# Patient Record
Sex: FEMALE | Race: WHITE | Employment: FULL TIME | URBAN - METROPOLITAN AREA
[De-identification: names, ages, dates, MRNs, and addresses within clinical notes are randomized per-mention and may not be internally consistent; named-entity substitution may affect disease eponyms.]

---

## 2021-01-05 LAB
SARS-COV-2: NOT DETECTED
SOURCE: NORMAL

## 2021-01-13 LAB
SARS-COV-2: NOT DETECTED
SOURCE: NORMAL

## 2021-01-20 LAB
SARS-COV-2: NOT DETECTED
SOURCE: NORMAL

## 2021-01-26 LAB
SARS-COV-2: NOT DETECTED
SOURCE: NORMAL

## 2021-02-03 LAB
SARS-COV-2: NOT DETECTED
SOURCE: NORMAL

## 2021-02-11 LAB
SARS-COV-2: NOT DETECTED
SOURCE: NORMAL

## 2021-02-18 LAB
SARS-COV-2: NOT DETECTED
SOURCE: NORMAL

## 2021-02-23 LAB
SARS-COV-2: NOT DETECTED
SOURCE: NORMAL

## 2021-03-03 LAB
SARS-COV-2: NOT DETECTED
SOURCE: NORMAL

## 2021-03-09 LAB
SARS-COV-2: NOT DETECTED
SOURCE: NORMAL

## 2021-03-16 LAB
SARS-COV-2: NOT DETECTED
SOURCE: NORMAL

## 2022-10-10 ENCOUNTER — OFFICE VISIT (OUTPATIENT)
Dept: PEDIATRICS CLINIC | Age: 57
End: 2022-10-10
Payer: COMMERCIAL

## 2022-10-10 VITALS
WEIGHT: 197 LBS | BODY MASS INDEX: 30.92 KG/M2 | HEART RATE: 90 BPM | OXYGEN SATURATION: 98 % | TEMPERATURE: 96.5 F | HEIGHT: 67 IN

## 2022-10-10 DIAGNOSIS — B34.9 VIRAL ILLNESS: ICD-10-CM

## 2022-10-10 DIAGNOSIS — B34.9 VIRAL ILLNESS: Primary | ICD-10-CM

## 2022-10-10 LAB
INFLUENZA A ANTIBODY: NORMAL
INFLUENZA B ANTIBODY: NORMAL
Lab: NORMAL
PERFORMING INSTRUMENT: NORMAL
QC PASS/FAIL: NORMAL
S PYO AG THROAT QL: NORMAL
SARS-COV-2, POC: NORMAL

## 2022-10-10 PROCEDURE — 87804 INFLUENZA ASSAY W/OPTIC: CPT | Performed by: NURSE PRACTITIONER

## 2022-10-10 PROCEDURE — 87426 SARSCOV CORONAVIRUS AG IA: CPT | Performed by: NURSE PRACTITIONER

## 2022-10-10 PROCEDURE — 87880 STREP A ASSAY W/OPTIC: CPT | Performed by: NURSE PRACTITIONER

## 2022-10-10 PROCEDURE — 99213 OFFICE O/P EST LOW 20 MIN: CPT | Performed by: NURSE PRACTITIONER

## 2022-10-10 NOTE — PROGRESS NOTES
Subjective:      Patient ID: Husam Grandchild is a 56 y.o. female who present today with:      Chief Complaint   Patient presents with    Pharyngitis    Cough    Headache     Pharyngitis  This is a new problem. The current episode started in the past 7 days. The problem occurs constantly. The problem has been unchanged. Associated symptoms include congestion, coughing, fatigue, headaches and a sore throat. Pertinent negatives include no abdominal pain, anorexia, arthralgias, change in bowel habit, chest pain, chills, diaphoresis, fever, joint swelling, myalgias, nausea, neck pain, numbness, rash, swollen glands, urinary symptoms, vertigo, visual change, vomiting or weakness. Nothing aggravates the symptoms. She has tried nothing for the symptoms. The treatment provided no relief. Cough  This is a new problem. The current episode started in the past 7 days. The problem has been unchanged. The problem occurs constantly. The cough is Non-productive. Associated symptoms include headaches, nasal congestion and a sore throat. Pertinent negatives include no chest pain, chills, ear congestion, ear pain, eye redness, fever, heartburn, hemoptysis, myalgias, postnasal drip, rash, rhinorrhea, shortness of breath, sweats, weight loss or wheezing. Nothing aggravates the symptoms. She has tried nothing for the symptoms. The treatment provided no relief. There is no history of asthma, bronchiectasis, bronchitis, COPD, emphysema, environmental allergies or pneumonia.      Past Medical History:   Diagnosis Date    Hematuria, microscopic         Migraine      Patient Active Problem List    Diagnosis Date Noted    Bilateral hearing loss 12/11/2019    Cervicalgia 12/11/2019    Obesity, Class I, BMI 30-34.9 04/02/2019    Myopia with presbyopia 12/04/2014    Adjustment disorder with mixed anxiety and depressed mood 11/13/2014    Migraine     Hematuria, microscopic      Past Surgical History:   Procedure Laterality Date    DILATION AND CURETTAGE OF UTERUS  2005    OVARIAN CYST SURGERY  1988    ruptured     Social History     Socioeconomic History    Marital status:      Spouse name: None    Number of children: None    Years of education: None    Highest education level: None   Tobacco Use    Smoking status: Never    Smokeless tobacco: Never   Substance and Sexual Activity    Alcohol use: Yes     Comment: few/ wk    Drug use: No     Current Outpatient Medications on File Prior to Visit   Medication Sig Dispense Refill    SUMAtriptan-Naproxen Sodium (TREXIMET)  MG TABS tablet Take 1 tablet by mouth once for 1 dose. 9 tablet 6     No current facility-administered medications on file prior to visit. No Known Allergies     Review of Systems   Constitutional:  Positive for activity change and fatigue. Negative for appetite change, chills, diaphoresis, fever and weight loss. HENT:  Positive for congestion and sore throat. Negative for ear pain, mouth sores, postnasal drip, rhinorrhea, sinus pressure, sinus pain and trouble swallowing. Eyes:  Negative for photophobia, pain, discharge, redness and itching. Respiratory:  Positive for cough. Negative for hemoptysis, chest tightness, shortness of breath and wheezing. Cardiovascular:  Negative for chest pain. Gastrointestinal:  Negative for abdominal pain, anorexia, change in bowel habit, constipation, diarrhea, heartburn, nausea and vomiting. Musculoskeletal:  Negative for arthralgias, joint swelling, myalgias and neck pain. Skin:  Negative for rash. Allergic/Immunologic: Negative for environmental allergies. Neurological:  Positive for headaches. Negative for vertigo, seizures, syncope, weakness and numbness. Objective:      Vitals:    10/10/22 1448   Pulse: 90   Temp: (!) 96.5 °F (35.8 °C)   TempSrc: Temporal   SpO2: 98%   Weight: 197 lb (89.4 kg)   Height: 5' 6.5\" (1.689 m)     Physical Exam  Constitutional:       General: She is not in acute distress. Appearance: Normal appearance. She is not ill-appearing. HENT:      Head: Normocephalic and atraumatic. Right Ear: Hearing, tympanic membrane, ear canal and external ear normal.      Left Ear: Hearing, tympanic membrane, ear canal and external ear normal.      Nose: Congestion and rhinorrhea present. Rhinorrhea is clear. Right Sinus: No maxillary sinus tenderness or frontal sinus tenderness. Left Sinus: No maxillary sinus tenderness or frontal sinus tenderness. Mouth/Throat:      Lips: Pink. Mouth: Mucous membranes are moist.      Pharynx: Oropharynx is clear. Uvula midline. Tonsils: No tonsillar exudate. Eyes:      General: Lids are normal. Vision grossly intact. Extraocular Movements: Extraocular movements intact. Conjunctiva/sclera: Conjunctivae normal.      Pupils: Pupils are equal, round, and reactive to light. Cardiovascular:      Rate and Rhythm: Normal rate and regular rhythm. Heart sounds: Normal heart sounds. Pulmonary:      Effort: Pulmonary effort is normal.      Breath sounds: Normal breath sounds and air entry. Lymphadenopathy:      Head:      Right side of head: No submandibular or tonsillar adenopathy. Left side of head: No submandibular or tonsillar adenopathy. Cervical: No cervical adenopathy. Skin:     General: Skin is warm and dry. Findings: No rash. Neurological:      Mental Status: She is alert. Assessment & Plan:   Chaparrita Beverly was seen today for pharyngitis, cough and headache. Diagnoses and all orders for this visit:    Viral illness  -     POCT rapid strep A  -     POCT Influenza A/B  -     POCT COVID-19, Antigen  -     Culture, Throat; Future    Side effects, adverse effects of the medication prescribed today, as well as treatment plan/ rationale and result expectations have been discussed with the patient who expresses understanding and desires to proceed.     Close follow up to evaluate treatment results and for

## 2022-10-11 PROBLEM — E66.9 OBESITY, CLASS I, BMI 30-34.9: Status: ACTIVE | Noted: 2019-04-02

## 2022-10-11 PROBLEM — H91.93 BILATERAL HEARING LOSS: Status: ACTIVE | Noted: 2019-12-11

## 2022-10-11 PROBLEM — M54.2 CERVICALGIA: Status: ACTIVE | Noted: 2019-12-11

## 2022-10-11 PROBLEM — E66.811 OBESITY, CLASS I, BMI 30-34.9: Status: ACTIVE | Noted: 2019-04-02

## 2022-10-11 ASSESSMENT — ENCOUNTER SYMPTOMS
TROUBLE SWALLOWING: 0
DIARRHEA: 0
ABDOMINAL PAIN: 0
EYE PAIN: 0
VISUAL CHANGE: 0
CONSTIPATION: 0
EYE DISCHARGE: 0
EYE REDNESS: 0
NAUSEA: 0
SHORTNESS OF BREATH: 0
HEARTBURN: 0
CHEST TIGHTNESS: 0
PHOTOPHOBIA: 0
SINUS PRESSURE: 0
WHEEZING: 0
SORE THROAT: 1
EYE ITCHING: 0
CHANGE IN BOWEL HABIT: 0
SINUS PAIN: 0
COUGH: 1
VOMITING: 0
HEMOPTYSIS: 0
RHINORRHEA: 0
SWOLLEN GLANDS: 0

## 2022-10-11 ASSESSMENT — VISUAL ACUITY: OU: 1

## 2022-10-12 LAB — THROAT CULTURE: NORMAL

## 2025-02-20 ENCOUNTER — APPOINTMENT (OUTPATIENT)
Dept: CT IMAGING | Age: 60
End: 2025-02-20
Payer: OTHER MISCELLANEOUS

## 2025-02-20 ENCOUNTER — HOSPITAL ENCOUNTER (EMERGENCY)
Age: 60
Discharge: HOME | End: 2025-02-20
Attending: STUDENT IN AN ORGANIZED HEALTH CARE EDUCATION/TRAINING PROGRAM
Payer: OTHER MISCELLANEOUS

## 2025-02-20 VITALS
BODY MASS INDEX: 32.14 KG/M2 | OXYGEN SATURATION: 99 % | DIASTOLIC BLOOD PRESSURE: 89 MMHG | HEART RATE: 78 BPM | SYSTOLIC BLOOD PRESSURE: 140 MMHG | WEIGHT: 200 LBS | TEMPERATURE: 97.9 F | RESPIRATION RATE: 16 BRPM | HEIGHT: 66 IN

## 2025-02-20 DIAGNOSIS — S13.4XXA WHIPLASH INJURY TO NECK, INITIAL ENCOUNTER: ICD-10-CM

## 2025-02-20 DIAGNOSIS — S06.0X0A CONCUSSION WITHOUT LOSS OF CONSCIOUSNESS, INITIAL ENCOUNTER: Primary | ICD-10-CM

## 2025-02-20 PROCEDURE — 99284 EMERGENCY DEPT VISIT MOD MDM: CPT

## 2025-02-20 PROCEDURE — 6370000000 HC RX 637 (ALT 250 FOR IP): Performed by: STUDENT IN AN ORGANIZED HEALTH CARE EDUCATION/TRAINING PROGRAM

## 2025-02-20 PROCEDURE — 72125 CT NECK SPINE W/O DYE: CPT

## 2025-02-20 PROCEDURE — 70450 CT HEAD/BRAIN W/O DYE: CPT

## 2025-02-20 RX ORDER — ACETAMINOPHEN 500 MG
1000 TABLET ORAL ONCE
Status: COMPLETED | OUTPATIENT
Start: 2025-02-20 | End: 2025-02-20

## 2025-02-20 RX ORDER — CETIRIZINE HYDROCHLORIDE 5 MG/1
5 TABLET ORAL DAILY
COMMUNITY

## 2025-02-20 RX ORDER — LISINOPRIL 5 MG/1
5 TABLET ORAL DAILY
COMMUNITY

## 2025-02-20 RX ADMIN — ACETAMINOPHEN 1000 MG: 500 TABLET ORAL at 08:40

## 2025-02-20 ASSESSMENT — ENCOUNTER SYMPTOMS
ABDOMINAL PAIN: 0
EYE REDNESS: 0
PHOTOPHOBIA: 1
BACK PAIN: 0
COUGH: 0
EYE DISCHARGE: 0
NAUSEA: 0
EYE PAIN: 0
WHEEZING: 0
VOMITING: 0
COLOR CHANGE: 0
RHINORRHEA: 0
EYE ITCHING: 0
CHEST TIGHTNESS: 0

## 2025-02-20 ASSESSMENT — PAIN DESCRIPTION - ORIENTATION
ORIENTATION: LEFT
ORIENTATION: LEFT
ORIENTATION_2: POSTERIOR

## 2025-02-20 ASSESSMENT — LIFESTYLE VARIABLES
HOW OFTEN DO YOU HAVE A DRINK CONTAINING ALCOHOL: 2-3 TIMES A WEEK
HOW MANY STANDARD DRINKS CONTAINING ALCOHOL DO YOU HAVE ON A TYPICAL DAY: 1 OR 2

## 2025-02-20 ASSESSMENT — PAIN DESCRIPTION - PAIN TYPE: TYPE: ACUTE PAIN

## 2025-02-20 ASSESSMENT — PAIN DESCRIPTION - LOCATION
LOCATION: NECK
LOCATION_2: HEAD
LOCATION: NECK;HEAD

## 2025-02-20 ASSESSMENT — PAIN SCALES - GENERAL
PAINLEVEL_OUTOF10: 6
PAINLEVEL_OUTOF10: 6

## 2025-02-20 ASSESSMENT — PAIN DESCRIPTION - DESCRIPTORS
DESCRIPTORS_2: ACHING
DESCRIPTORS: TIGHTNESS

## 2025-02-20 ASSESSMENT — PAIN - FUNCTIONAL ASSESSMENT: PAIN_FUNCTIONAL_ASSESSMENT: 0-10

## 2025-02-20 ASSESSMENT — PAIN DESCRIPTION - INTENSITY: RATING_2: 6
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
